# Patient Record
Sex: MALE | Race: AMERICAN INDIAN OR ALASKA NATIVE
[De-identification: names, ages, dates, MRNs, and addresses within clinical notes are randomized per-mention and may not be internally consistent; named-entity substitution may affect disease eponyms.]

---

## 2020-09-10 ENCOUNTER — HOSPITAL ENCOUNTER (OUTPATIENT)
Dept: HOSPITAL 5 - ED | Age: 45
Setting detail: OBSERVATION
LOS: 1 days | Discharge: HOME | End: 2020-09-11
Attending: INTERNAL MEDICINE | Admitting: INTERNAL MEDICINE
Payer: MEDICAID

## 2020-09-10 DIAGNOSIS — E78.5: ICD-10-CM

## 2020-09-10 DIAGNOSIS — I10: Primary | ICD-10-CM

## 2020-09-10 DIAGNOSIS — Z79.84: ICD-10-CM

## 2020-09-10 DIAGNOSIS — Z98.890: ICD-10-CM

## 2020-09-10 DIAGNOSIS — R07.89: ICD-10-CM

## 2020-09-10 DIAGNOSIS — E78.00: ICD-10-CM

## 2020-09-10 DIAGNOSIS — E11.9: ICD-10-CM

## 2020-09-10 DIAGNOSIS — R42: ICD-10-CM

## 2020-09-10 DIAGNOSIS — E66.01: ICD-10-CM

## 2020-09-10 LAB
BASOPHILS # (AUTO): 0 K/MM3 (ref 0–0.1)
BASOPHILS NFR BLD AUTO: 0.5 % (ref 0–1.8)
BUN SERPL-MCNC: 14 MG/DL (ref 9–20)
BUN/CREAT SERPL: 18 %
CALCIUM SERPL-MCNC: 9.7 MG/DL (ref 8.4–10.2)
EOSINOPHIL # BLD AUTO: 0.1 K/MM3 (ref 0–0.4)
EOSINOPHIL NFR BLD AUTO: 1.4 % (ref 0–4.3)
HCT VFR BLD CALC: 40.1 % (ref 35.5–45.6)
HEMOLYSIS INDEX: 1
HGB BLD-MCNC: 12.9 GM/DL (ref 11.8–15.2)
LYMPHOCYTES # BLD AUTO: 2.3 K/MM3 (ref 1.2–5.4)
LYMPHOCYTES NFR BLD AUTO: 27.9 % (ref 13.4–35)
MCHC RBC AUTO-ENTMCNC: 32 % (ref 32–34)
MCV RBC AUTO: 77 FL (ref 84–94)
MONOCYTES # (AUTO): 0.6 K/MM3 (ref 0–0.8)
MONOCYTES % (AUTO): 7.1 % (ref 0–7.3)
PLATELET # BLD: 305 K/MM3 (ref 140–440)
RBC # BLD AUTO: 5.18 M/MM3 (ref 3.65–5.03)

## 2020-09-10 PROCEDURE — 80048 BASIC METABOLIC PNL TOTAL CA: CPT

## 2020-09-10 PROCEDURE — 96374 THER/PROPH/DIAG INJ IV PUSH: CPT

## 2020-09-10 PROCEDURE — 36415 COLL VENOUS BLD VENIPUNCTURE: CPT

## 2020-09-10 PROCEDURE — 78452 HT MUSCLE IMAGE SPECT MULT: CPT

## 2020-09-10 PROCEDURE — 96375 TX/PRO/DX INJ NEW DRUG ADDON: CPT

## 2020-09-10 PROCEDURE — 70450 CT HEAD/BRAIN W/O DYE: CPT

## 2020-09-10 PROCEDURE — G0378 HOSPITAL OBSERVATION PER HR: HCPCS

## 2020-09-10 PROCEDURE — 85025 COMPLETE CBC W/AUTO DIFF WBC: CPT

## 2020-09-10 PROCEDURE — 71045 X-RAY EXAM CHEST 1 VIEW: CPT

## 2020-09-10 PROCEDURE — 93005 ELECTROCARDIOGRAM TRACING: CPT

## 2020-09-10 PROCEDURE — A9502 TC99M TETROFOSMIN: HCPCS

## 2020-09-10 PROCEDURE — 93017 CV STRESS TEST TRACING ONLY: CPT

## 2020-09-10 PROCEDURE — 84484 ASSAY OF TROPONIN QUANT: CPT

## 2020-09-10 PROCEDURE — 82962 GLUCOSE BLOOD TEST: CPT

## 2020-09-10 PROCEDURE — 93306 TTE W/DOPPLER COMPLETE: CPT

## 2020-09-10 PROCEDURE — 99285 EMERGENCY DEPT VISIT HI MDM: CPT

## 2020-09-11 VITALS — DIASTOLIC BLOOD PRESSURE: 76 MMHG | SYSTOLIC BLOOD PRESSURE: 126 MMHG

## 2020-09-11 LAB
BASOPHILS # (AUTO): 0 K/MM3 (ref 0–0.1)
BASOPHILS NFR BLD AUTO: 0.6 % (ref 0–1.8)
BUN SERPL-MCNC: 14 MG/DL (ref 9–20)
BUN/CREAT SERPL: 18 %
CALCIUM SERPL-MCNC: 9.5 MG/DL (ref 8.4–10.2)
EOSINOPHIL # BLD AUTO: 0.2 K/MM3 (ref 0–0.4)
EOSINOPHIL NFR BLD AUTO: 2.3 % (ref 0–4.3)
HCT VFR BLD CALC: 40.5 % (ref 35.5–45.6)
HEMOLYSIS INDEX: 3
HGB BLD-MCNC: 12.7 GM/DL (ref 11.8–15.2)
LYMPHOCYTES # BLD AUTO: 2.3 K/MM3 (ref 1.2–5.4)
LYMPHOCYTES NFR BLD AUTO: 32.6 % (ref 13.4–35)
MCHC RBC AUTO-ENTMCNC: 31 % (ref 32–34)
MCV RBC AUTO: 78 FL (ref 84–94)
MONOCYTES # (AUTO): 0.7 K/MM3 (ref 0–0.8)
MONOCYTES % (AUTO): 10.5 % (ref 0–7.3)
PLATELET # BLD: 282 K/MM3 (ref 140–440)
RBC # BLD AUTO: 5.16 M/MM3 (ref 3.65–5.03)

## 2020-09-11 RX ADMIN — INSULIN LISPRO SCH: 100 INJECTION, SOLUTION INTRAVENOUS; SUBCUTANEOUS at 10:04

## 2020-09-11 RX ADMIN — INSULIN LISPRO SCH: 100 INJECTION, SOLUTION INTRAVENOUS; SUBCUTANEOUS at 12:10

## 2020-09-11 RX ADMIN — INSULIN LISPRO SCH: 100 INJECTION, SOLUTION INTRAVENOUS; SUBCUTANEOUS at 16:31

## 2020-09-11 NOTE — CAT SCAN REPORT
CT HEAD WITHOUT CONTRAST  

 

HISTORY: Patient complains of dizziness and headache x 2 days.

 

COMPARISON: None 



TECHNIQUE:

 CT imaging of the head was performed in the axial, sagittal, and coronal projections and bone algori
thm in axial projection in the soft tissue algorithm.

All CT scans at this location are performed using CT dose reduction for ALARA by means of automated e
xposure control. 



CONTRAST: None.

 

FINDINGS:

Cerebral and Cerebellar Hemispheres: No evidence of mass or mass effect.  No midline shift.  No acute
 hemorrhage.  No acute cortical infarction.  No extra-axial fluid collection.

Ventricles: Normal in size and configuration for age.   

Osseous Structures: No significant abnormality.  

Visualized Paranasal Sinuses: No significant abnormality.

 

Additional Findings: None

 

IMPRESSION:

 

1.  No acute intracranial abnormality. 



NOTE: Acute infarct may not be visible by noncontrast CT.



Signer Name: Oscar Bennett MD 

Signed: 9/11/2020 4:18 AM

Workstation Name: [id number]

## 2020-09-11 NOTE — XRAY REPORT
CHEST 1 VIEW 



INDICATION: 

chest pain



COMPARISON: 

none



FINDINGS:



SUPPORT DEVICES: None.



HEART / MEDIASTINUM: No significant abnormality.



LUNGS / PLEURA: No significant pulmonary or pleural abnormality. No pneumothorax. 



ADDITIONAL FINDINGS: 



IMPRESSION:

1. No acute cardiopulmonary disease



Signer Name: Oscar Bennett MD 

Signed: 9/11/2020 3:34 AM

Workstation Name: [id number]

## 2020-09-11 NOTE — EMERGENCY DEPARTMENT REPORT
HPI





- General


Chief Complaint: Nausea/Vomiting/Diarrhea


Time Seen by Provider: 20 02:57





- [facility]


HPI: 





Room 21








The patient is a 45-year-old male present with a chief complaint of dizziness 

and chest pain.  The patient states for the past 2-1/2 days he has had 

intermittent chest pain, dizziness, headache nausea and vomiting.  The patient 

states his chest pain is been sharp and intermittent in nature.  Patient denies 

shortness of breath or diaphoresis.  Patient denies any preceding trauma.  

Patient denies history of fever or known contact with COVID-19 patients.  

Patient currently gives a chest pain score 5/10.  Patient states he is never had

a stress test or cardiac catheterization





ED Past Medical Hx





- Past Medical History


Previous Medical History?: Yes


Hx Hypertension: Yes


Hx Diabetes: Yes


Additional medical history: Morbid Obesity, High Cholesterol





- Surgical History


Past Surgical History?: Yes


Additional Surgical History: RIGHT ANKLE





- Family History


Family history: no significant





- Social History


Smoking Status: Never Smoker


Substance Use Type: None (Denies illicit drug use)





ED Review of Systems


ROS: 


Stated complaint: LIGHT HEADED


Other details as noted in HPI





Constitutional: denies: diaphoresis


Respiratory: denies: cough, shortness of breath


Cardiovascular: chest pain


Endocrine: no symptoms reported


Gastrointestinal: nausea, vomiting


Musculoskeletal: denies: back pain


Neurological: headache, vertigo





Physical Exam





- Physical Exam


Vital Signs: 


                                   Vital Signs











  09/10/20





  21:48


 


Temperature 97.8 F


 


Pulse Rate 85


 


Respiratory 18





Rate 


 


Blood Pressure 115/68


 


O2 Sat by Pulse 97





Oximetry 











Physical Exam: 





GENERAL: The patient is well-developed well-nourished male lying on stretcher 

not appearing to be in acute distress. []


HEENT: Normocephalic.  Atraumatic.  Extraocular motions are intact.  Patient has

 moist mucous membranes.  No nystagmus noted


NECK: Supple.  No meningitic signs are noted.  Trachea midline


CHEST/LUNGS: Clear to auscultation.  There is no respiratory distress noted.


HEART/CARDIOVASCULAR: Regular.  There is no tachycardia.  There is no gallop rub

 or murmur.


ABDOMEN: Abdomen is soft, nontender.  Patient has normal bowel sounds.  There is

 no abdominal distention.


SKIN: There is no rash.  There is no edema.  There is no diaphoresis.


NEURO: The patient is awake, alert, and oriented.  The patient is cooperative.  

The patient has no focal neurologic deficits.  The patient has normal speech.  

Cranial nerves II through XII grossly intact


MUSCULOSKELETAL:  There is no evidence of acute injury.





ED Course


                                   Vital Signs











  09/10/20





  21:48


 


Temperature 97.8 F


 


Pulse Rate 85


 


Respiratory 18





Rate 


 


Blood Pressure 115/68


 


O2 Sat by Pulse 97





Oximetry 














ED Medical Decision Making





- Lab Data


Result diagrams: 


                                 09/10/20 21:57





                                 09/10/20 21:57





                                Laboratory Tests











  09/10/20 09/10/20 09/10/20





  21:57 21:57 22:08


 


WBC  8.1  


 


RBC  5.18 H  


 


Hgb  12.9  


 


Hct  40.1  


 


MCV  77 L  


 


MCH  25 L  


 


MCHC  32  


 


RDW  15.9 H  


 


Plt Count  305  


 


Lymph % (Auto)  27.9  


 


Mono % (Auto)  7.1  


 


Eos % (Auto)  1.4  


 


Baso % (Auto)  0.5  


 


Lymph #  2.3  


 


Mono #  0.6  


 


Eos #  0.1  


 


Baso #  0.0  


 


Seg Neutrophils %  63.1  


 


Seg Neutrophils #  5.1  


 


Sodium   143 


 


Potassium   3.9 


 


Chloride   102.1 


 


Carbon Dioxide   29 


 


Anion Gap   16 


 


BUN   14 


 


Creatinine   0.8 


 


Estimated GFR   > 60 


 


BUN/Creatinine Ratio   18 


 


Glucose   87 


 


POC Glucose    86


 


Calcium   9.7 


 


Troponin T   














  20





  03:16


 


WBC 


 


RBC 


 


Hgb 


 


Hct 


 


MCV 


 


MCH 


 


MCHC 


 


RDW 


 


Plt Count 


 


Lymph % (Auto) 


 


Mono % (Auto) 


 


Eos % (Auto) 


 


Baso % (Auto) 


 


Lymph # 


 


Mono # 


 


Eos # 


 


Baso # 


 


Seg Neutrophils % 


 


Seg Neutrophils # 


 


Sodium 


 


Potassium 


 


Chloride 


 


Carbon Dioxide 


 


Anion Gap 


 


BUN 


 


Creatinine 


 


Estimated GFR 


 


BUN/Creatinine Ratio 


 


Glucose 


 


POC Glucose 


 


Calcium 


 


Troponin T  < 0.010














- EKG Data


-: EKG Interpreted by Me


EKG shows normal: sinus rhythm


Rate: normal





- EKG Data


When compared to previous EKG there are: previous EKG unavailable


Interpretation: other (No ischemic changes seen)





- Radiology Data


Radiology results: report reviewed (CT head, chest x-ray), image reviewed (CT 

head, chest x-ray)


interpreted by me: 





Chest x-ray-no focal infiltrates, no pneumothorax, no foreign body





Findings


[facility] [address] [place] [address] Cat

 Scan Report Signed Patient: JAIDA VASQUEZ MR#: [id number] : 

1975 Acct:[id number] Age/Sex: 45 / M ADM Date: 09/10/20 Loc: ED 

Attending Dr: Ordering Physician: CAROLINA FRITZ MD Date of Service: 20 

Procedure(s): CT head/brain wo con Accession Number(s): [id number] cc: CAROLINA FRITZ MD CT HEAD WITHOUT CONTRAST HISTORY: Patient complains of dizziness and 

headache x 2 days. COMPARISON: None TECHNIQUE: CT imaging of the head was 

performed in the axial, sagittal, and coronal projections and bone algorithm in 

axial projection in the soft tissue algorithm. All CT scans at this location are

 performed using CT dose reduction for ALARA by means of automated exposure 

control. CONTRAST: None. FINDINGS: Cerebral and Cerebellar Hemispheres: No 

evidence of mass or mass effect. No midline shift. No acute hemorrhage. No acute

 cortical infarction. No extra-axial fluid collection. Ventricles: Normal in 

size and configuration for age. Osseous Structures: No significant abnormality. 

Visualized Paranasal Sinuses: No significant abnormality. Additional Findings: 

None IMPRESSION: 1. No acute intracranial abnormality. NOTE: Acute infarct may 

not be visible by noncontrast CT. Signer Name: Oscar Bennett MD Signed: 

2020 4:18 AM Workstation Name: [id number] Transcribed By: WG Dictated By:

 Oscar Bennett MD Electronically Authenticated By: Oscar Bennett MD Signed Date/Time: 20 DD/DT: 20 TD/TT: 





Findings


[facility] [address] [place] [address] 

XRay Report Signed Patient: JAIDA VASQUEZ MR#: [id number] : 1975

 Acct:[id number] Age/Sex: 45 / M ADM Date: 09/10/20 Loc: ED Attending Dr: 

Ordering Physician: CAROLINA FRITZ MD Date of Service: 20 Procedure(s): XR 

chest 1V ap Accession Number(s): [id number] cc: CAROLINA FRITZ MD Fluoro Time In 

Minutes: CHEST 1 VIEW INDICATION: chest pain COMPARISON: none FINDINGS: SUPPORT 

DEVICES: None. HEART / MEDIASTINUM: No significant abnormality. LUNGS / PLEURA: 

No significant pulmonary or pleural abnormality. No pneumothorax. ADDITIONAL 

FINDINGS: IMPRESSION: 1. No acute cardiopulmonary disease Signer Name: Oscar Bennett MD Signed: 2020 3:34 AM Workstation Name: [email or web address] 

Transcribed By: WG Dictated By: Oscar Bennett MD Electronically 

Authenticated By: Oscar Bennett MD Signed Date/Time: 20 DD/DT:

 20 TD/TT: 











- Differential Diagnosis


Vertigo, ICH, ACS, pericarditis, GERD


Critical care attestation.: 


If time is entered above; I have spent that time in minutes in the direct care 

of this critically ill patient, excluding procedure time.








ED Disposition


Clinical Impression: 


 Chest pain, Dizziness





Disposition:  OP ADMIT IP TO THIS HOSP


Is pt being admited?: Yes


Does the pt Need Aspirin: Yes


Condition: Stable


Instructions:  Chest Pain (ED)


Referrals: 


PRIMARY CARE,MD [Primary Care Provider] - 3-5 Days


Time of Disposition: 04:31 (Hospitalist paged (Dr Colon))





HEART Score





- HEART Score


History: Slightly suspicious


EKG: Normal


Age: < 45


Risk factors: 1-2 risk factors


Troponin: < normal limit


HEART Score: 1

## 2020-09-11 NOTE — HISTORY AND PHYSICAL REPORT
History of Present Illness


Date of examination: 09/11/20


Date of admission: 


09/11/2020


Chief complaint: 





Chest Pain


History of present illness: 





Patient is a 45-year-old male with significant past medical history of 

hyperlipidemia, hypertension and diabetes mellitus presenting to the emergency 

room today complaining of chest pain.  Chest pain is said to have been ongoing 

for about 2 to 3 days and he has been having associated nausea, headache and 

dizziness.  Chest pain is said to be sharp and intermittent in nature.  There is

no no relieving or exacerbating factor.


He denies any shortness of breath and no diaphoresis.  He denies any fever or 

chills, denies any sick contacts and no recent travel, denies any contact with 

anyone with COVID-19.  On a scale of 10 pain was about 5/10 in severity.





Work-up in the emergency room today has been unremarkable.





Past History


Past Medical History: diabetes, hyperlipidemia


Past Surgical History: Other (Right ankle surgery)


Social history: no significant social history


Family history: no significant family history





Medications and Allergies


                                    Allergies











Allergy/AdvReac Type Severity Reaction Status Date / Time


 


No Known Allergies Allergy   Verified 09/11/20 05:04











                                Home Medications











 Medication  Instructions  Recorded  Confirmed  Last Taken  Type


 


AtorvaSTATin [Lipitor] 10 mg PO QHS 09/11/20 09/11/20 Unknown History


 


Glimepiride [Amaryl] 4 mg PO QAM 09/11/20 09/11/20 Unknown History


 


Lisinopril/Hydrochlorothiazide 20 - 25 mg PO QDAY 09/11/20 09/11/20 Unknown 

History


 


metFORMIN [Glucophage] 500 mg PO BID 09/11/20 09/11/20 Unknown History














Review of Systems


Constitutional: no fever, no chills


Ears, nose, mouth and throat: no nasal congestion, no sore throat


Cardiovascular: chest pain, no palpitations


Respiratory: shortness of breath, no cough


Gastrointestinal: no abdominal pain, no nausea, no vomiting, no diarrhea


Genitourinary Male: no dysuria, no hematuria, no flank pain, no nocturia


Musculoskeletal: no neck pain, no low back pain


Integumentary: no rash, no pruritis


Neurological: headaches, other (Dizziness), no confusion


Psychiatric: no anxiety, no depression





Exam





- Constitutional


Vitals: 


                                        











Temp Pulse Resp BP Pulse Ox


 


 97.8 F   68   22   115/68   93 


 


 09/10/20 21:48  09/11/20 04:26  09/11/20 04:26  09/10/20 21:48  09/11/20 04:26











General appearance: Present: no acute distress, well-nourished, obese





- EENT


Eyes: Present: PERRL, EOM intact


ENT: hearing intact, clear oral mucosa, dentition normal





- Neck


Neck: Present: supple, normal ROM





- Respiratory


Respiratory effort: normal


Respiratory: bilateral: CTA





- Cardiovascular


Rhythm: regular


Heart Sounds: Present: S1 & S2.  Absent: gallop, systolic murmur, diastolic 

murmur, rub





- Extremities


Extremities: no ischemia, pulses intact, pulses symmetrical, No edema, Full ROM


Peripheral Pulses: within normal limits





- Abdominal


General gastrointestinal: Present: soft, non-tender, non-distended, normal bowel

 sounds.  Absent: mass





- Integumentary


Integumentary: Present: clear, warm, dry





- Musculoskeletal


Musculoskeletal: strength equal bilaterally





- Psychiatric


Psychiatric: appropriate mood/affect, intact judgment & insight, memory intact, 

cooperative





- Neurologic


Neurologic: CNII-XII intact, no focal deficits, moves all extremities





HEART Score





- HEART Score


History: Moderately suspicious


EKG: Normal


Age: < 45


Risk factors: > 3 risk factors or hx of atherosclerotic disease


Troponin: 


                                        











Troponin T  < 0.010 ng/mL (0.00-0.029)   09/11/20  03:16    











Troponin: < normal limit


HEART Score: 3





Results





- Labs


CBC & Chem 7: 


                                 09/10/20 21:57





                                 09/10/20 21:57


Labs: 


                              Abnormal lab results











  09/10/20 Range/Units





  21:57 


 


RBC  5.18 H  (3.65-5.03)  M/mm3


 


MCV  77 L  (84-94)  fl


 


MCH  25 L  (28-32)  pg


 


RDW  15.9 H  (13.2-15.2)  %














Assessment and Plan





- Patient Problems


(1) Hypertension


Current Visit: Yes   Status: Acute   





(2) Diabetes mellitus


Current Visit: Yes   Status: Acute   





(3) Hyperlipidemia


Current Visit: Yes   Status: Acute   





(4) DVT prophylaxis


Current Visit: Yes   Status: Acute   





(5) Full code status


Current Visit: Yes   Status: Acute   





(6) Chest pain


Current Visit: Yes   Status: Acute

## 2020-09-11 NOTE — TREADMILL REPORT
THALLIUM STRESS TEST REPORT



LEFT VENTRICLE:  Left ventricular chamber size is within normal spread. 

Perfusion study demonstrates homogeneous uptake of the tracer in all segments,

no defects identified.



Gated analysis demonstrates normal left ventricular systolic function, ejection

fraction 51%.



CONCLUSION:  Normal myocardial perfusion study.





DD: 09/11/2020 12:48

DT: 09/11/2020 14:35

JOB# [id number]  [id number]

CA/NTS

## 2020-09-11 NOTE — DISCHARGE SUMMARY
Providers





- Providers


Date of Admission: 


09/11/20 04:45





Date of discharge: 09/11/20


Attending physician: 


RISSA BASS





                                        





09/11/20 04:53


Consult to Dietitian/Nutrition [CONS] Routine 


   Physician Instructions: 


   Reason For Exam: 


   Reason for Consult: Diet education











Primary care physician: 


PRIMARY CARE MD








Hospitalization


Condition: Good


Hospital course: 





45-year-old male with a history of hypertension diabetes presented with atypical

 chest pain.  Patient has had chest pain for 3 days associated with some nausea.

  Head CT was negative.  Patient describes pain pain is 5 out of 10 substernal 

now resolved.  Patient had echocardiogram showed ejection fraction 50 to 60%.  

Patient had stress test and worked up to 6 minutes of Rancho protocol without 

pain without any shortness of breath nausea vomiting.  Stable for discharge.


Disposition: DC-01 TO HOME OR SELFCARE





- Discharge Diagnoses


(1) Chest pain


Status: Acute   





(2) Diabetes mellitus


Status: Acute   





(3) Hyperlipidemia


Status: Acute   





(4) Hypertension


Status: Acute   





Core Measure Documentation





- Palliative Care


Palliative Care/ Comfort Measures: Not Applicable





- Core Measures


Any of the following diagnoses?: none





Exam





- Constitutional


Vitals: 


                                        











Temp Pulse Resp BP Pulse Ox


 


 98.0 F   90   19   126/76   96 


 


 09/11/20 08:16  09/11/20 16:07  09/11/20 10:00  09/11/20 16:07  09/11/20 16:07











General appearance: Present: no acute distress, well-nourished





- EENT


Eyes: Present: PERRL


ENT: hearing intact, clear oral mucosa





- Neck


Neck: Present: supple, normal ROM





- Respiratory


Respiratory effort: normal


Respiratory: bilateral: CTA





- Cardiovascular


Heart Sounds: Present: S1 & S2.  Absent: rub, click





- Extremities


Extremities: pulses symmetrical, No edema


Peripheral Pulses: within normal limits





- Abdominal


General gastrointestinal: Present: soft, non-tender, non-distended, normal bowel

 sounds


Male genitourinary: Present: normal





- Integumentary


Integumentary: Present: clear, warm, dry





- Musculoskeletal


Musculoskeletal: gait normal, strength equal bilaterally





- Psychiatric


Psychiatric: appropriate mood/affect, intact judgment & insight





- Neurologic


Neurologic: CNII-XII intact, moves all extremities





Plan


Activity: no restrictions


Weight Bearing Status: Full Weight Bearing


Diet: low cholesterol, low salt


Follow up with: 


PRIMARY CARE,MD [Primary Care Provider] - 3-5 Days

## 2020-09-11 NOTE — CONSULTATION
History of Present Illness


Consult date: 09/11/20


Consult reason: chest pain


History of present illness: 





45-year-old man with a history of hypertension diabetes, admitted with chest 

pain associated with lightheadedness and nausea.  There was no exertional 

component to his symptoms.  EKG on presentation was a sinus rhythm at 100, 

otherwise normal ECG.  An echocardiogram ordered by the medical service 

demonstrated well-preserved left ventricular systolic function with ejection fr

action 50 to 55%.





Today, he underwent an exercise thallium stress test during which he exercised 

for 6 minutes of a Rancho protocol, completing stage II and achieving 7 METS.  

There was no chest pain, no ST changes of ischemia and thallium images were 

normal.





Past History


Past Medical History: diabetes, hyperlipidemia


Past Surgical History: Other (Right ankle surgery)


Social history: no significant social history


Family history: no significant family history





Medications and Allergies


                                    Allergies











Allergy/AdvReac Type Severity Reaction Status Date / Time


 


No Known Allergies Allergy   Verified 09/11/20 05:04











                                Home Medications











 Medication  Instructions  Recorded  Confirmed  Last Taken  Type


 


AtorvaSTATin [Lipitor] 10 mg PO QHS 09/11/20 09/11/20 Unknown History


 


Glimepiride [Amaryl] 4 mg PO QAM 09/11/20 09/11/20 Unknown History


 


Lisinopril/Hydrochlorothiazide 20 - 25 mg PO QDAY 09/11/20 09/11/20 Unknown 

History


 


metFORMIN [Glucophage] 500 mg PO BID 09/11/20 09/11/20 Unknown History











Active Meds: 


Active Medications





Dextrose (D50w (25gm) Syringe)  50 ml IV Q30MIN PRN; Protocol


   PRN Reason: Hypoglycemia


Enoxaparin Sodium (Enoxaparin)  40 mg SUB-Q QDAY@2200 SILVIO; Protocol


Insulin Human Lispro (Humalog)  0 unit SUB-Q ACHS SILVIO; Protocol


   Last Admin: 09/11/20 12:10 Dose:  Not Given


   Documented by: 











Review of Systems


Cardiovascular: chest pain, no orthopnea, no palpitations, no rapid/irregular 

heart beat, no edema, no syncope, no lightheadedness, no shortness of breath





Physical Examination


                                   Vital Signs











Temp Pulse Resp BP Pulse Ox


 


 97.8 F   85   18   115/68   97 


 


 09/10/20 21:48  09/10/20 21:48  09/10/20 21:48  09/10/20 21:48  09/10/20 21:48











General appearance: no acute distress, obese


HEENT: Positive: PERRL


Neck: Positive: neck supple


Cardiac: Positive: Reg Rate and Rhythm


Lungs: Positive: Decreased Breath Sounds


Neuro: Positive: Grossly Intact


Abdomen: Positive: Soft


Male genitourinary: Positive: deferred


Skin: Positive: Clear


Extremities: Absent: edema





Results





                                 09/11/20 08:01





                                 09/11/20 08:01


                                       CBC











  09/10/20 09/11/20 Range/Units





  21:57 08:01 


 


WBC  8.1  6.9  (4.5-11.0)  K/mm3


 


RBC  5.18 H  5.16 H  (3.65-5.03)  M/mm3


 


Hgb  12.9  12.7  (11.8-15.2)  gm/dl


 


Hct  40.1  40.5  (35.5-45.6)  %


 


Plt Count  305  282  (140-440)  K/mm3


 


Lymph #  2.3  2.3  (1.2-5.4)  K/mm3


 


Mono #  0.6  0.7  (0.0-0.8)  K/mm3


 


Eos #  0.1  0.2  (0.0-0.4)  K/mm3


 


Baso #  0.0  0.0  (0.0-0.1)  K/mm3








                          Comprehensive Metabolic Panel











  09/10/20 09/11/20 Range/Units





  21:57 08:01 


 


Sodium  143  141  (137-145)  mmol/L


 


Potassium  3.9  3.8  (3.6-5.0)  mmol/L


 


Chloride  102.1  99.1  ()  mmol/L


 


Carbon Dioxide  29  30  (22-30)  mmol/L


 


BUN  14  14  (9-20)  mg/dL


 


Creatinine  0.8  0.8  (0.8-1.3)  mg/dL


 


Glucose  87  109 H  ()  mg/dL


 


Calcium  9.7  9.5  (8.4-10.2)  mg/dL














EKG interpretations





- Telemetry


EKG Rhythm: Sinus Rhythm





Assessment and Plan





- Patient Problems


(1) Chest pain


Current Visit: Yes   Status: Acute   


Plan to address problem: 


Chest pain is atypical, serial ECGs are normal, echocardiogram shows 

well-preserved left ventricular systolic function, ejection fraction 50 to 55%, 

exercise thallium stress test was negative.  No further cardiac work-up is 

indicated, patient is recommended for cardiac outpatient follow-up and 

monitoring on discharge.